# Patient Record
(demographics unavailable — no encounter records)

---

## 2024-10-14 NOTE — PHYSICAL EXAM
[Alert] : alert [No Acute Distress] : no acute distress [EOMI] : extra ocular movement intact [Normal Hearing] : hearing was normal [No Neck Mass] : no neck mass was observed [No Respiratory Distress] : no respiratory distress [No Accessory Muscle Use] : no accessory muscle use [Normal Rate and Effort] : normal respiratory rate and effort [Clear to Auscultation] : lungs were clear to auscultation bilaterally [Normal S1, S2] : normal S1 and S2 [Normal Rate] : heart rate was normal [No Edema] : no peripheral edema [Pedal Pulses Normal] : the pedal pulses are present [Not Distended] : not distended [Spine Straight] : spine straight [No Stigmata of Cushings Syndrome] : no stigmata of Cushings Syndrome [Normal Gait] : normal gait [No Joint Swelling] : no joint swelling seen [Oriented x3] : oriented to person, place, and time [Normal Insight/Judgement] : insight and judgment were intact

## 2024-10-14 NOTE — REVIEW OF SYSTEMS
[As Noted in HPI] : as noted in HPI [Dysuria] : dysuria [Vaginal Discharge] : vaginal discharge [Negative] : Endocrine

## 2024-10-14 NOTE — HISTORY OF PRESENT ILLNESS
[FreeTextEntry1] : Kenia Nuñez is a 65 year old female who presents for follow up on Type 2 DM management. DM diagnosed >10 yrs ago Complications: neuropathy   Patient of Dr. Geiger, last seen September 15, 2023   PMHx:  HLD, Obesity Alleriges: morphine derivatives   She has had a lot going on since last seen in office -- her father passed away in March 2024 and she has been in [place] for a while afterwards. Father's passing has been source of significant stress, also has not been sleeping well.   Is seeing Dr. Geiger in November, but GYN recommended she be seen sooner as she has been having yeast infections  Describes vaginal itching, local irritation, burning which have been going on >1 month Saw GYN in [place] for this and was given Fluconazole x 2 doses which helped briefly, however symptoms returned.  She then used steroid ointment (?clobetasol) which helped but notes she cannot take long term.  Also tried stopping taking Synjardy for 1 week which resolved the burning temporarily - it returned when she restarted medication.  She returned to GYN in [place] but per pt, it is unsure if she still has yeast infection or these symptoms are d/t other cause (possibly atrophy a/w menopause or excess tissue in area after weight loss, per GYN) The symptoms have persisted, and last week she developed slight vaginal discharge  Also using vaginal estrogen cream. Has not seen GYN since returning to [place]  Current medication regimen:  -- Ozempic 2mg weekly -- Synjardy 12.5-1000mg BID   POCT A1c in office: 7.4% FS in office: 132 -- had a coffee at [employer] and 2 Splendas at 7-8AM   Kenia measures blood glucose at home via fingerstick/glucometer. , 109 PPBS: doesn't check Hypoglycemia: None recently but historically has occurred around 4pm (~4hrs after lunch); BG ~90 and she feels shaky. Has not noticed BG<90   Podiatry: Has not seen, due for follow up  Ophthalmology: Has not seen, due for follow up Saw Dentist recently   Diet: Pretty consistent, may have 2-2.5meals/day Exercise: Stopped going to gym after death of father   Current Medications: Lipitor, Vitamin B12 (because of metformin), vaginal estrogen cream

## 2024-10-14 NOTE — ASSESSMENT
[FreeTextEntry1] : Type 2 DM, generally well controlled, with complications including neuropathy  Current regimen: Synjardy 12.5-1000mg BID, Ozempic 2mg weekly  A1c has risen in the last year from 6.9% to 7.4% despite no changes to diet/medication, however she notes multiple stressors including death of her father in March, and subsequent managing of his estate, poor sleep, less activity since father's passing which may be contributing. Has also had persistent vaginal itching/burning which has persisted beyond tx with Fluconazole.  Saw GYN in [place] who recommended follow up for medication review.   Given persistence of symptoms and temporary resolution when she paused Synjardy, will recommend stopping Synjardy and only taking Metformin and follow up with GYN locally to rule out yeast infection caused by Jardiance component of Synjardy and ensure it resolves.   Goal A1c <7%, A1c 7.4% Goal BP <130/80, BP today 127/68, not on regimen Goal LDL <100mg/dL, recent LDL unknown, on Atorvastatin 40mg daily  Plan -- Labs ordered, to be done before appt with Dr. Geiger in November -- Stop Synjardy, start Metformin ER 1000mg BID -- Continue Ozempic -- GYN referral -- Discussed increasing activity as she is able (returning to gym) - aiming for 150 min moderate exercise/week -- Continue checking BG at home, FBS/PPBS to write on log and bring to next appt. -- Follow up with podiatrist, ophthalmologist. -- Follow up with Dr. Geiger as planned in November, with myself in 4 months  Vaginal itching Describes vaginal itching, burning, local irritation occurring >1 month Treated with fluconazole/steroid ointment for yeast infection, but sx returned Notes she stopped Synjardy x1 week and burning resolved, but returned after Synjardy restarted  Plan -- Pausing Synjardy at this time to ensure resolution of possible yeast infection -- Follow up with GYN locally, referral given (notes she may also be interested in tx of menopause sx) -- Follow up with Dr. Geiger as planned in November, with myself in 4 months

## 2024-11-26 NOTE — HISTORY OF PRESENT ILLNESS
[FreeTextEntry1] : Ms. Nuñez is a 65 year-old woman presenting for follow-up of type 2 diabetes mellitus. I saw her for an initial visit in September 2023; she saw Kavita Gabriel NP in October 2024.  Type 2 diabetes mellitus. HbA1c 7.4% in October 2024 and glucose 212 mg/dL today (recent lunch); HbA1c 6.9% in September 2023. Neuropathy.  She was diagnosed with diabetes over 10 years ago. No hospitalizations for hypo- or hyperglycemia. At her initial visit she was taking Ozempic 1 mg weekly and Synjardy 12.5/1000 mg twice daily.  In September 2023 we adjusted Ozempic to 2 mg weekly and continued Synjardy 12.5/1000 mg twice daily.  In October 2024 we continued Ozempic 2 mg weekly, stopped Synjardy due to recurrent yeast infections and started metformin ER 1000 mg twice daily.  She is checking blood sugars up to twice daily as below She is not on a blood pressure regimen She is on a statin for cholesterol Nephropathy screening: Urine microalbumin within the normal range in November 2024 Last ophthalmology appointment: Over a year Last dental appointment: Within six months  Interim History  In October 2024 we continued Ozempic 2 mg weekly, stopped Synjardy due to recurrent yeast infections and started metformin ER 1000 mg twice daily.  She is currently taking Ozempic 2 mg weekly and metformin ER 1000 mg twice daily.  Fasting glucose values 130s mg/dL. Early afternoon values 110-130s mg/dL.  Recent laboratory results significant for the below; see scanned report. Lipid panel not at goal. TSH and urine microalbumin within the normal ranges. Her highest weight was 236 pounds. No chest pain, shortness of breath, polyuria/polydipsia, lower extremity numbness/tingling.  Medical and surgical history, medications, allergies, social and family history reviewed and updated as needed.

## 2024-11-26 NOTE — PHYSICAL EXAM
[Alert] : alert [Healthy Appearance] : healthy appearance [No Acute Distress] : no acute distress [Normal Sclera/Conjunctiva] : normal sclera/conjunctiva [Normal Hearing] : hearing was normal [No Respiratory Distress] : no respiratory distress [No Stigmata of Cushings Syndrome] : no stigmata of Cushings Syndrome [Normal Gait] : normal gait [Right foot was examined, including] : right foot ~C was examined, including visual inspection with sensory and pulse exams [Left foot was examined, including] : left foot ~C was examined, including visual inspection with sensory and pulse exams [Normal] : normal [2+] : 2+ in the dorsalis pedis [Normal Insight/Judgement] : insight and judgment were intact [Kyphosis] : no kyphosis present [Acanthosis Nigricans] : no acanthosis nigricans [Diminished Throughout Both Feet] : normal tactile sensation with monofilament testing throughout both feet [de-identified] : no moon facies, no supraclavicular fat pads

## 2024-11-26 NOTE — DATA REVIEWED
[FreeTextEntry1] : Laboratories (November 18, 2024) reviewed and significant for:  Unremarkable comprehensive metabolic panel TSH 1.59 uIU/mL  mg/dL HDL 58 mg/dL Total cholesterol 201 mg/dL Triglycerides 139 mg/dL Urine microalbumin 4 mg/g creatinine

## 2024-11-26 NOTE — ASSESSMENT
[FreeTextEntry1] : Type 2 diabetes mellitus/elevated body mass index. HbA1c 7.4% in October 2024 and glucose 212 mg/dL today (recent lunch). Neuropathy. I congratulated her on her excellent glycemic control and overall weight loss. We have discussed the cardiovascular and microvascular complications of uncontrolled diabetes. We discussed the importance of diet and exercise and lifestyle modification for glycemic control and weight loss. We discussed pharmacologic options for glycemic control and weight loss. She is amenable to a trial of a combined GLP-1/GIP receptor agonist. We discussed the risks and benefits of the GLP-1/GIP receptor agonist class, including but not limited to nausea, pancreatitis, medullary thyroid cancer. We reviewed subcutaneous injection technique, storage, and sharps disposal. Sample provided of Mounjaro 2.5 mg, lot [id number], exp 06/26/2026.  Continue metformin ER 1000 mg twice daily Stop Ozempic and start Mounjaro 2.5 mg weekly for four weeks, then 5 mg weekly for four weeks, then 7.5 mg weekly Monitor blood sugars daily, fasting or postprandial; reviewed glycemic goals She is not on a blood pressure regimen; blood pressure around goal She is on a statin for cholesterol; last LDL above goal and will adjust atorvastatin from 40 to 80 mg daily Nephropathy screening: Urine microalbumin within the normal range in November 2024 Last ophthalmology appointment: Over a year Last dental appointment: Within six months  Return to clinic in 3 months. Patient advised to call earlier with significant hypo- or hyperglycemia.

## 2025-03-10 NOTE — PHYSICAL EXAM
[Alert] : alert [No Acute Distress] : no acute distress [EOMI] : extra ocular movement intact [Normal Hearing] : hearing was normal [No Respiratory Distress] : no respiratory distress [No Accessory Muscle Use] : no accessory muscle use [Normal Rate and Effort] : normal respiratory rate and effort [Not Distended] : not distended [Spine Straight] : spine straight [No Stigmata of Cushings Syndrome] : no stigmata of Cushings Syndrome [Normal Gait] : normal gait [No Joint Swelling] : no joint swelling seen [Oriented x3] : oriented to person, place, and time [Normal Insight/Judgement] : insight and judgment were intact [Kyphosis] : no kyphosis present

## 2025-03-10 NOTE — DATA REVIEWED
[FreeTextEntry1] : 11/29/24 , Tg 139, HDL 56, total-c 201 serum creatinine 0.72, GFR 93 ACR 4 TSH 1.59  09/2023: A1c 6.9%

## 2025-03-10 NOTE — HISTORY OF PRESENT ILLNESS
[FreeTextEntry1] : Kenia Nuñez is a 65 year old female who presents for follow up on Type 2 DM management. DM diagnosed >10 yrs ago Complications: neuropathy   Patient of Dr. Geiger, last seen November 2024   PMHx:  HLD, Obesity Alleriges: morphine derivatives   Synjardy discontinued due to recurrent yeast infections in October 2024, and switched to Mounjaro in November 2024 She is tolerating Mounjaro, feels she is not losing weight, but also not exercising Has had a lot of stress taking care of family members since her father's passing last year Says she has appetite control when she wants to but not necessarily suppression Thinks she had greater appetite suppression on Ozempic LMP >10 yrs ago, no longer having vasomotor symptoms of menopause, still using vaginal cream PRN  Current medication regimen:  -- Mounjaro 7.5mg weekly -- Mtformin 1000mg BID    POCT A1c in office: 7.7% FS in office: 139 -- had 1/2 cup coffee with 2% milk and 2 splenda   Kenia measures blood glucose at home via fingerstick/glucometer. -140  PPBS: not checking Hypoglycemia: no lows   Podiatry: Has not seen, due for follow up  Ophthalmology: Has not seen, due for follow up Saw Dentist recently   Diet: Pretty consistent, may have 2-2.5meals/day Exercise: Walking a lot but not otherwise   Current Medications: Lipitor 80mg daily, Vitamin B12, Mounjaro 7.5mg weekly, Metformin 1000mg BID, vaginal estrogen cream, magnesium gluconate

## 2025-03-10 NOTE — ASSESSMENT
[FreeTextEntry1] : Type 2 DM, generally well controlled, with complications including neuropathy  Current regimen: Metformin 1g BID, Mounjaro 7.5mg weekly  Has been tolerating Mounjaro well, though has not noticed the same appetite suppression she had with Ozempic. FBS ~130-140, above goal. She is hopeful to increase physical activity soon and continue healthy diet. Will plan to continue Metformin, increase Mounjaro, and increase lifestyle interventions for improved glucose control  Goal A1c <7%, A1c 7.7% Goal BP <130/80, BP today 107/65, not on regimen Goal LDL <100mg/dL, recent , on Atorvastatin 80mg daily  Plan -- Labs ordered, will call with results -- Continue Metformin ER 1000mg BID -- Increase Mounjaro to 10mg weekly -- Discussed increasing activity as she is able (returning to gym) - aiming for 150 min moderate exercise/week -- Continue checking BG at home, FBS/PPBS to write on log and bring to next appt. -- Follow up with podiatrist, ophthalmologist. -- Follow up with Dr. Geiger in May as planned, with me in Fall 2025

## 2025-03-10 NOTE — REVIEW OF SYSTEMS
[Recent Weight Gain (___ Lbs)] : recent weight gain: [unfilled] lbs [Stress] : stress [Negative] : Neurological [Heat Intolerance] : no heat intolerance [Hot Flashes] : no hot flashes